# Patient Record
Sex: MALE | Race: WHITE | NOT HISPANIC OR LATINO | Employment: UNEMPLOYED | URBAN - METROPOLITAN AREA
[De-identification: names, ages, dates, MRNs, and addresses within clinical notes are randomized per-mention and may not be internally consistent; named-entity substitution may affect disease eponyms.]

---

## 2024-09-27 ENCOUNTER — APPOINTMENT (OUTPATIENT)
Dept: RADIOLOGY | Facility: HOSPITAL | Age: 49
End: 2024-09-27

## 2024-09-27 ENCOUNTER — HOSPITAL ENCOUNTER (EMERGENCY)
Facility: HOSPITAL | Age: 49
Discharge: HOME | End: 2024-09-27
Attending: STUDENT IN AN ORGANIZED HEALTH CARE EDUCATION/TRAINING PROGRAM

## 2024-09-27 VITALS
HEART RATE: 69 BPM | TEMPERATURE: 97.9 F | HEIGHT: 71 IN | RESPIRATION RATE: 16 BRPM | BODY MASS INDEX: 24.07 KG/M2 | OXYGEN SATURATION: 97 % | DIASTOLIC BLOOD PRESSURE: 71 MMHG | SYSTOLIC BLOOD PRESSURE: 101 MMHG | WEIGHT: 171.96 LBS

## 2024-09-27 DIAGNOSIS — K62.89 PROCTITIS: Primary | ICD-10-CM

## 2024-09-27 LAB
ALBUMIN SERPL BCP-MCNC: 3.6 G/DL (ref 3.4–5)
ALP SERPL-CCNC: 91 U/L (ref 33–120)
ALT SERPL W P-5'-P-CCNC: 9 U/L (ref 10–52)
ANION GAP SERPL CALC-SCNC: 11 MMOL/L (ref 10–20)
APTT PPP: 35 SECONDS (ref 27–38)
AST SERPL W P-5'-P-CCNC: 19 U/L (ref 9–39)
BASOPHILS # BLD AUTO: 0.04 X10*3/UL (ref 0–0.1)
BASOPHILS NFR BLD AUTO: 0.4 %
BILIRUB SERPL-MCNC: 0.4 MG/DL (ref 0–1.2)
BUN SERPL-MCNC: 16 MG/DL (ref 6–23)
CALCIUM SERPL-MCNC: 8.6 MG/DL (ref 8.6–10.3)
CHLORIDE SERPL-SCNC: 98 MMOL/L (ref 98–107)
CO2 SERPL-SCNC: 29 MMOL/L (ref 21–32)
CREAT SERPL-MCNC: 0.89 MG/DL (ref 0.5–1.3)
EGFRCR SERPLBLD CKD-EPI 2021: >90 ML/MIN/1.73M*2
EOSINOPHIL # BLD AUTO: 0.21 X10*3/UL (ref 0–0.7)
EOSINOPHIL NFR BLD AUTO: 2.4 %
ERYTHROCYTE [DISTWIDTH] IN BLOOD BY AUTOMATED COUNT: 12.7 % (ref 11.5–14.5)
GLUCOSE SERPL-MCNC: 100 MG/DL (ref 74–99)
HCT VFR BLD AUTO: 40.1 % (ref 41–52)
HGB BLD-MCNC: 12.9 G/DL (ref 13.5–17.5)
IMM GRANULOCYTES # BLD AUTO: 0.02 X10*3/UL (ref 0–0.7)
IMM GRANULOCYTES NFR BLD AUTO: 0.2 % (ref 0–0.9)
INR PPP: 1.2 (ref 0.9–1.1)
LACTATE SERPL-SCNC: 0.7 MMOL/L (ref 0.4–2)
LIPASE SERPL-CCNC: 5 U/L (ref 9–82)
LYMPHOCYTES # BLD AUTO: 1.6 X10*3/UL (ref 1.2–4.8)
LYMPHOCYTES NFR BLD AUTO: 17.9 %
MCH RBC QN AUTO: 27.9 PG (ref 26–34)
MCHC RBC AUTO-ENTMCNC: 32.2 G/DL (ref 32–36)
MCV RBC AUTO: 87 FL (ref 80–100)
MONOCYTES # BLD AUTO: 0.89 X10*3/UL (ref 0.1–1)
MONOCYTES NFR BLD AUTO: 10 %
NEUTROPHILS # BLD AUTO: 6.16 X10*3/UL (ref 1.2–7.7)
NEUTROPHILS NFR BLD AUTO: 69.1 %
NRBC BLD-RTO: 0 /100 WBCS (ref 0–0)
PLATELET # BLD AUTO: 338 X10*3/UL (ref 150–450)
POTASSIUM SERPL-SCNC: 4.5 MMOL/L (ref 3.5–5.3)
PROT SERPL-MCNC: 6.9 G/DL (ref 6.4–8.2)
PROTHROMBIN TIME: 13.7 SECONDS (ref 9.8–12.8)
RBC # BLD AUTO: 4.63 X10*6/UL (ref 4.5–5.9)
SODIUM SERPL-SCNC: 133 MMOL/L (ref 136–145)
WBC # BLD AUTO: 8.9 X10*3/UL (ref 4.4–11.3)

## 2024-09-27 PROCEDURE — 83690 ASSAY OF LIPASE: CPT | Performed by: PHYSICIAN ASSISTANT

## 2024-09-27 PROCEDURE — 36415 COLL VENOUS BLD VENIPUNCTURE: CPT | Performed by: PHYSICIAN ASSISTANT

## 2024-09-27 PROCEDURE — 83605 ASSAY OF LACTIC ACID: CPT | Performed by: PHYSICIAN ASSISTANT

## 2024-09-27 PROCEDURE — 2550000001 HC RX 255 CONTRASTS: Performed by: STUDENT IN AN ORGANIZED HEALTH CARE EDUCATION/TRAINING PROGRAM

## 2024-09-27 PROCEDURE — 80053 COMPREHEN METABOLIC PANEL: CPT | Performed by: PHYSICIAN ASSISTANT

## 2024-09-27 PROCEDURE — 74174 CTA ABD&PLVS W/CONTRAST: CPT | Mod: FOREIGN READ | Performed by: RADIOLOGY

## 2024-09-27 PROCEDURE — 85730 THROMBOPLASTIN TIME PARTIAL: CPT | Performed by: PHYSICIAN ASSISTANT

## 2024-09-27 PROCEDURE — 85610 PROTHROMBIN TIME: CPT | Performed by: PHYSICIAN ASSISTANT

## 2024-09-27 PROCEDURE — 99284 EMERGENCY DEPT VISIT MOD MDM: CPT | Mod: 25

## 2024-09-27 PROCEDURE — 74174 CTA ABD&PLVS W/CONTRAST: CPT

## 2024-09-27 PROCEDURE — 85025 COMPLETE CBC W/AUTO DIFF WBC: CPT | Performed by: PHYSICIAN ASSISTANT

## 2024-09-27 RX ORDER — METRONIDAZOLE 500 MG/1
500 TABLET ORAL 3 TIMES DAILY
Qty: 30 TABLET | Refills: 0 | Status: SHIPPED | OUTPATIENT
Start: 2024-09-27 | End: 2024-10-07

## 2024-09-27 RX ORDER — CIPROFLOXACIN 500 MG/1
500 TABLET ORAL 2 TIMES DAILY
Qty: 20 TABLET | Refills: 0 | Status: SHIPPED | OUTPATIENT
Start: 2024-09-27 | End: 2024-10-07

## 2024-09-27 RX ORDER — FENTANYL CITRATE 50 UG/ML
50 INJECTION, SOLUTION INTRAMUSCULAR; INTRAVENOUS ONCE
Status: DISCONTINUED | OUTPATIENT
Start: 2024-09-27 | End: 2024-09-27 | Stop reason: HOSPADM

## 2024-09-27 ASSESSMENT — COLUMBIA-SUICIDE SEVERITY RATING SCALE - C-SSRS
2. HAVE YOU ACTUALLY HAD ANY THOUGHTS OF KILLING YOURSELF?: NO
1. IN THE PAST MONTH, HAVE YOU WISHED YOU WERE DEAD OR WISHED YOU COULD GO TO SLEEP AND NOT WAKE UP?: NO
6. HAVE YOU EVER DONE ANYTHING, STARTED TO DO ANYTHING, OR PREPARED TO DO ANYTHING TO END YOUR LIFE?: NO

## 2024-09-27 ASSESSMENT — PAIN DESCRIPTION - ORIENTATION: ORIENTATION: LOWER

## 2024-09-27 ASSESSMENT — PAIN - FUNCTIONAL ASSESSMENT: PAIN_FUNCTIONAL_ASSESSMENT: 0-10

## 2024-09-27 ASSESSMENT — PAIN DESCRIPTION - DESCRIPTORS: DESCRIPTORS: ACHING

## 2024-09-27 ASSESSMENT — PAIN DESCRIPTION - LOCATION: LOCATION: ABDOMEN

## 2024-09-27 NOTE — DISCHARGE INSTRUCTIONS
Please follow-up with your primary care provider 1 to 2 days, or return to the emergency department immediately with any worsening symptoms.    If any medications were prescribed please take them as instructed.  
Statement Selected

## 2024-09-27 NOTE — ED PROVIDER NOTES
EMERGENCY MEDICINE EVALUATION NOTE    History of Present Illness     Chief Complaint:   Chief Complaint   Patient presents with    Rectal Bleeding     ABD PAIN        HPI: Jim Park is a 49 y.o. male presents with a chief complaint of rectal bleeding and abdominal pain.  Patient reports this been going on for over a year.  He states that he is to have an issue with rectal prolapse however he had a large hemorrhoid 1 time and push the hemorrhoid back into his rectum and since then his rectum has not prolapse.  He reports that basically anytime he goes to the bathroom he noticed that there is blood there.  He states when he does pass stool it is very hard and almost like he is constipated.  Patient reports that he has diffuse pain across his lower abdomen.  He states he does have a history of diverticulosis but has not been seen for this in a long time.  Patient reports that he has not had blood work or seen him physician in many years.  Sister reports that she went and checked on him and noted that he had lost a lot of weight and was that he was not eating anything so she brought him in for further evaluation.  Patient does not take any medication at home.  He states that he basically sits on his couch and then goes to the restroom as needed.  Denies any fevers or chills.    Previous History     Past Medical History:   Diagnosis Date    Strain of unspecified muscle, fascia and tendon at wrist and hand level, left hand, initial encounter 05/02/2016    Strain of left hand, initial encounter     History reviewed. No pertinent surgical history.     No family history on file.  No Known Allergies  Current Outpatient Medications   Medication Instructions    ciprofloxacin (CIPRO) 500 mg, oral, 2 times daily    metroNIDAZOLE (FLAGYL) 500 mg, oral, 3 times daily       Physical Exam     Appearance: Alert, oriented , cooperative     Skin: Intact,  dry skin, no lesions, rash, petechiae or purpura.      Eyes: PERRLA, EOMs intact,   Conjunctiva pink      ENT: Hearing grossly intact. Pharynx clear, uvula midline.      Neck: Supple. Trachea at midline.      Pulmonary: Clear bilaterally. No rales, rhonchi or wheezing. No accessory muscle use or stridor.     Cardiac: Normal rate and rhythm without murmur     Abdomen: Soft, active bowel sounds.  Diffuse lower abdominal pain.  Easily reducible ventral wall hernia.     Musculoskeletal: Full range of motion.     Neurological:Cranial nerves II through XII are grossly intact, normal sensation, no weakness, no focal findings identified.     Results     Labs Reviewed   CBC WITH AUTO DIFFERENTIAL - Abnormal       Result Value    WBC 8.9      nRBC 0.0      RBC 4.63      Hemoglobin 12.9 (*)     Hematocrit 40.1 (*)     MCV 87      MCH 27.9      MCHC 32.2      RDW 12.7      Platelets 338      Neutrophils % 69.1      Immature Granulocytes %, Automated 0.2      Lymphocytes % 17.9      Monocytes % 10.0      Eosinophils % 2.4      Basophils % 0.4      Neutrophils Absolute 6.16      Immature Granulocytes Absolute, Automated 0.02      Lymphocytes Absolute 1.60      Monocytes Absolute 0.89      Eosinophils Absolute 0.21      Basophils Absolute 0.04     COMPREHENSIVE METABOLIC PANEL - Abnormal    Glucose 100 (*)     Sodium 133 (*)     Potassium 4.5      Chloride 98      Bicarbonate 29      Anion Gap 11      Urea Nitrogen 16      Creatinine 0.89      eGFR >90      Calcium 8.6      Albumin 3.6      Alkaline Phosphatase 91      Total Protein 6.9      AST 19      Bilirubin, Total 0.4      ALT 9 (*)    LIPASE - Abnormal    Lipase 5 (*)     Narrative:     Venipuncture immediately after or during the administration of Metamizole may lead to falsely low results. Testing should be performed immediately prior to Metamizole dosing.   PROTIME-INR - Abnormal    Protime 13.7 (*)     INR 1.2 (*)    LACTATE - Normal    Lactate 0.7      Narrative:     Venipuncture immediately after or during the administration of Metamizole may lead  "to falsely low results. Testing should be performed immediately prior to Metamizole dosing.   APTT - Normal    aPTT 35      Narrative:     The APTT is no longer used for monitoring Unfractionated Heparin Therapy. For monitoring Heparin Therapy, use the Heparin Assay.   URINALYSIS WITH REFLEX CULTURE AND MICROSCOPIC    Narrative:     The following orders were created for panel order Urinalysis with Reflex Culture and Microscopic.  Procedure                               Abnormality         Status                     ---------                               -----------         ------                     Urinalysis with Reflex C...[[id number]]                                                 Extra Urine Gray Tube[[id number]]                                                         Please view results for these tests on the individual orders.   URINALYSIS WITH REFLEX CULTURE AND MICROSCOPIC   EXTRA URINE GRAY TUBE     CT angio abdomen pelvis w and or wo IV IV contrast   Final Result   No evidence of active GI bleeding. Bilateral lower lobe pulmonary   nodules and diffuse liver lesions consistent with metastatic disease.   Rectal wall thickening which may be due to proctitis. Underlying   neoplasm to be excluded. Normal abdominal aorta and iliac arteries. No   evidence of mesenteric artery stenosis or occlusion. Normal appendix.   Signed by Phu Mendoza MD            ED Course & Medical Decision Making     Medications   fentaNYL PF (Sublimaze) injection 50 mcg (has no administration in time range)   iohexol (OMNIPaque) 350 mg iodine/mL solution 100 mL (100 mL intravenous Given 9/27/24 1241)     Heart Rate:  [69-86]   Temperature:  [36.6 °C (97.9 °F)]   Respirations:  [12-16]   BP: (101-113)/(70-79)   Height:  [180.3 cm (5' 11\")]   Weight:  [78 kg (171 lb 15.3 oz)]   Pulse Ox:  [97 %-98 %]    ED Course as of 09/27/24 1450   Fri Sep 27, 2024   1447 Updated the patient on the results.  Patient does have significant proctitis " and they cannot rule out underlying mass.  Patient also has some potential metastatic lesion on the liver.  I did update the patient on the results and the findings.  We discussed plan of care going forward.  I informed him he needs to follow-up as an outpatient with GI as well as he will be given referral to the [facility] center for potential management if there were to be a mass.  From our perspective we will treat him as if it is infectious we will give him Cipro and Flagyl for home.  Patient is in agreement with this plan.  Patient be discharged at this time to follow-up as an outpatient return here immediately with any worsening symptoms.  Family was at bedside who also agrees with plan of care. [CJ]      ED Course User Index  [CJ] Faustino Morrison PA-C         Diagnoses as of 09/27/24 1450   Proctitis       Procedures   Procedures    Diagnosis     1. Proctitis        Disposition   Discharged     ED Prescriptions       Medication Sig Dispense Start Date End Date Auth. Provider    ciprofloxacin (Cipro) 500 mg tablet Take 1 tablet (500 mg) by mouth 2 times a day for 10 days. 20 tablet 9/27/2024 10/7/2024 Faustino Morrison PA-C    metroNIDAZOLE (Flagyl) 500 mg tablet Take 1 tablet (500 mg) by mouth 3 times a day for 10 days. 30 tablet 9/27/2024 10/7/2024 Faustino Morrison PA-C            Disclaimer: This note was dictated by speech recognition. Minor errors in transcription may be present. Please call if questions.       Faustino Morrison PA-C  09/27/24 1450       Faustino Morrison PA-C  09/27/24 1450

## 2024-09-30 ENCOUNTER — TELEPHONE (OUTPATIENT)
Dept: HEMATOLOGY/ONCOLOGY | Facility: HOSPITAL | Age: 49
End: 2024-09-30

## 2024-09-30 ENCOUNTER — APPOINTMENT (OUTPATIENT)
Dept: GASTROENTEROLOGY | Facility: CLINIC | Age: 49
End: 2024-09-30

## 2024-09-30 VITALS
WEIGHT: 177 LBS | BODY MASS INDEX: 24.78 KG/M2 | DIASTOLIC BLOOD PRESSURE: 64 MMHG | HEART RATE: 75 BPM | HEIGHT: 71 IN | SYSTOLIC BLOOD PRESSURE: 100 MMHG | OXYGEN SATURATION: 97 %

## 2024-09-30 DIAGNOSIS — R93.89 ABNORMAL CT SCAN: Primary | ICD-10-CM

## 2024-09-30 DIAGNOSIS — K62.89 PROCTITIS: ICD-10-CM

## 2024-09-30 PROCEDURE — 3008F BODY MASS INDEX DOCD: CPT | Performed by: PHYSICIAN ASSISTANT

## 2024-09-30 PROCEDURE — 99204 OFFICE O/P NEW MOD 45 MIN: CPT | Performed by: PHYSICIAN ASSISTANT

## 2024-09-30 RX ORDER — POLYETHYLENE GLYCOL 3350, SODIUM SULFATE ANHYDROUS, SODIUM BICARBONATE, SODIUM CHLORIDE, POTASSIUM CHLORIDE 236; 22.74; 6.74; 5.86; 2.97 G/4L; G/4L; G/4L; G/4L; G/4L
4000 POWDER, FOR SOLUTION ORAL ONCE
Qty: 4000 ML | Refills: 0 | Status: SHIPPED | OUTPATIENT
Start: 2024-09-30 | End: 2024-09-30

## 2024-09-30 NOTE — H&P (VIEW-ONLY)
"Subjective   Patient ID: Jim Park is a 49 y.o. male who was referred by ER providers for New Patient Visit.    No PCP    PMH: severe needle phobia    HPI  Patient is accompanied by sister.     Patient was in the ER on 9/27/2024 for rectal bleeding and abdominal pain. Patient states that he has been having rectal bleeding for the past year. He states that he would have a feeling to \"go to the bathroom\" and only pass red blood with mucous. He states that he has had worsening constipation. He states that the constipation because \"really bad\", but he did not give how long in between bowel movements he would go. He states that he has increased water and started OTC laxative with some improvement. Patient states that he has been having rectal pain, which has improved with antibiotics from the ER. Patient has been having some RUQ pain that he feels is related to a hernia. Just over the past 24 hours he has been having some right flank pain with taking a deep breath, which he feels he pulled a muscle. He states that he has lost 35 pounds unexplained this year. +nausea, no vomiting. Denies dysphagia. Patient reports what he suspected in the past as having a rectal prolapse that he was able to reduce on it's own. Denies jaundice.    In the ER, CBC showed slight anemia with hemoglobin of 12.9 with normocytic, hypochromic indices. LFTs normal. CT scan of the abdomen and pelvis showed bilateral lower lobe pulmonary nodules, diffuse liver lesions concerning for metastatic disease, and rectal wall thickening concerning for proctitis but mass not excluded. ER sent patient home with cipro and flagyl, and he admits that this has helped the rectal discomfort significantly.    Social History:  Tobacco: Former  Alcohol: None  Drug use: +THC    Family History: Denies     Prior GI evaluation:  Never    Review of Systems:  Constitutional: +35 pound weight loss  Skin: No reported icterus, lesions, or rash.  Eye: No reported itching, pain, " vision changes.  Ear: No reported discharge, hearing loss, or pain.  Nose: No reported congestion, discharge, or epistaxis.  Mouth/throat: No reported dysphagia, hoarseness, or throat pain.  Resp: No reported cough, dyspnea, or wheezing.  Cardiovascular: No reported chest pain, lower extremity edema, or palpitations.   GI: +worsening constipation, rectal bleeding, abdominal pain, rectal pain  : No reported dysuria, hematuria, or frequency.  Neuro: No reported confusion, memory loss, headaches, or dizziness.  Psych: No reported anxiety, depression, or insomnia.  Musculoskeletal: No reported arthralgia, joint swelling, or myalgias.  Heme/lymph: No reported easy bleeding or bruising, or swollen lymph nodes.  Endocrine: No reported cold/heat intolerance, polydipsia, or polyuria.     Medications:  Prior to Admission medications    Medication Sig Start Date End Date Taking? Authorizing Provider   ciprofloxacin (Cipro) 500 mg tablet Take 1 tablet (500 mg) by mouth 2 times a day for 10 days. 9/27/24 10/7/24  Faustino Morrison PA-C   metroNIDAZOLE (Flagyl) 500 mg tablet Take 1 tablet (500 mg) by mouth 3 times a day for 10 days. 9/27/24 10/7/24  Faustino Morrison PA-C       Allergies:  Patient has no known allergies.    Objective   Physical exam:  Constitutional: Well developed, well nourished 49 y.o. year old in no acute distress.   Skin: Warm and dry. Normal turgor. No rash, ulcer, trauma, jaundice.   Eyes: Pupils symmetric and reactive to light.  Respiratory: Clear to auscultation bilaterally. No wheezes, rhonchi, or rales heard.  Cardiovascular: Regular rate and rhythm. S1 and S2 appreciated and normal. No murmur, rub, or gallop heard.   Edema: No edema noted.  GI: Normal bowel sounds. Soft, non-distended, non-tender. No rebound or guarding present. No hepatomegaly or splenomegaly appreciated. Abdominal aorta not palpably abnormal.  Musculoskeletal: Limbs and Joints grossly normal. Full range of motion in major joint.   Neuro:  Alert and oriented x 3. Cranial nerves 2-12 grossly intact.   Psych: Normal mood and affect.        Relevant Results Recent labs reviewed in the EMR.    Radiology: Reviewed imaging reviewed in the EMR.    Assessment/Plan   Problem List Items Addressed This Visit             ICD-10-CM    Abnormal CT scan - Primary R93.89     Discussed with patient and his sister that given abnormal CT scan, worsening symptoms, rectal pain, and evidence of metastatic disease, I am concerned about rectal mass/cancer, which I discussed with patient and his sister. Patient does feel better since starting cipro and flagyl that the ER gave him. I recommended CEA and colonoscopy ASAP. Golytely prescribed. Patient is not vomiting.          Relevant Medications    Golytely 236-22.74-6.74 -5.86 gram solution    Other Relevant Orders    CEA    Colonoscopy Diagnostic     Other Visit Diagnoses         Codes    Proctitis     K62.89    Relevant Medications    Golytely 236-22.74-6.74 -5.86 gram solution    Other Relevant Orders    CEA    Colonoscopy Diagnostic            Meds to hold: none  Courtney Cruz PA-C

## 2024-09-30 NOTE — TELEPHONE ENCOUNTER
I spoke to Mr Park's sister Winsome this morning ([phone]). She is his primary support and transportation to appointments. We discussed that the [facility] oncology team would be the best next step since Mr. Park does not have insurance. I sent a referral to [facility] this morning. She verbalized understanding and I will be in touch as soon as I have more information.

## 2024-09-30 NOTE — ASSESSMENT & PLAN NOTE
Discussed with patient and his sister that given abnormal CT scan, worsening symptoms, rectal pain, and evidence of metastatic disease, I am concerned about rectal mass/cancer, which I discussed with patient and his sister. Patient does feel better since starting cipro and flagyl that the ER gave him. I recommended CEA and colonoscopy ASAP. Golytely prescribed. Patient is not vomiting.

## 2024-09-30 NOTE — PATIENT INSTRUCTIONS
Thank you for coming in for your appointment.    -Get colonoscopy done ASAP  -Bowel prep sent to pharmacy  -Blood work as able

## 2024-10-16 ENCOUNTER — PREP FOR PROCEDURE (OUTPATIENT)
Dept: GASTROENTEROLOGY | Facility: CLINIC | Age: 49
End: 2024-10-16
Payer: MEDICAID

## 2024-10-16 ENCOUNTER — ANESTHESIA EVENT (OUTPATIENT)
Dept: GASTROENTEROLOGY | Facility: HOSPITAL | Age: 49
End: 2024-10-16
Payer: MEDICAID

## 2024-10-17 ENCOUNTER — HOSPITAL ENCOUNTER (OUTPATIENT)
Dept: GASTROENTEROLOGY | Facility: HOSPITAL | Age: 49
Discharge: HOME | End: 2024-10-17
Payer: MEDICAID

## 2024-10-17 ENCOUNTER — ANESTHESIA (OUTPATIENT)
Dept: GASTROENTEROLOGY | Facility: HOSPITAL | Age: 49
End: 2024-10-17
Payer: MEDICAID

## 2024-10-17 VITALS
OXYGEN SATURATION: 97 % | HEART RATE: 87 BPM | SYSTOLIC BLOOD PRESSURE: 118 MMHG | RESPIRATION RATE: 16 BRPM | TEMPERATURE: 98.7 F | DIASTOLIC BLOOD PRESSURE: 80 MMHG

## 2024-10-17 DIAGNOSIS — K62.89 PROCTITIS: ICD-10-CM

## 2024-10-17 DIAGNOSIS — R93.89 ABNORMAL CT SCAN: ICD-10-CM

## 2024-10-17 DIAGNOSIS — C20 RECTAL CANCER (MULTI): Primary | ICD-10-CM

## 2024-10-17 PROCEDURE — 3700000001 HC GENERAL ANESTHESIA TIME - INITIAL BASE CHARGE

## 2024-10-17 PROCEDURE — 45331 SIGMOIDOSCOPY AND BIOPSY: CPT | Performed by: INTERNAL MEDICINE

## 2024-10-17 PROCEDURE — 7100000010 HC PHASE TWO TIME - EACH INCREMENTAL 1 MINUTE

## 2024-10-17 PROCEDURE — 7100000009 HC PHASE TWO TIME - INITIAL BASE CHARGE

## 2024-10-17 PROCEDURE — 3700000002 HC GENERAL ANESTHESIA TIME - EACH INCREMENTAL 1 MINUTE

## 2024-10-17 PROCEDURE — 2500000004 HC RX 250 GENERAL PHARMACY W/ HCPCS (ALT 636 FOR OP/ED): Performed by: NURSE ANESTHETIST, CERTIFIED REGISTERED

## 2024-10-17 RX ORDER — FENTANYL CITRATE 50 UG/ML
INJECTION, SOLUTION INTRAMUSCULAR; INTRAVENOUS AS NEEDED
Status: DISCONTINUED | OUTPATIENT
Start: 2024-10-17 | End: 2024-10-17

## 2024-10-17 RX ORDER — SODIUM CHLORIDE 0.9 % (FLUSH) 0.9 %
SYRINGE (ML) INJECTION AS NEEDED
Status: DISCONTINUED | OUTPATIENT
Start: 2024-10-17 | End: 2024-10-17

## 2024-10-17 RX ORDER — LIDOCAINE HYDROCHLORIDE 20 MG/ML
INJECTION, SOLUTION INFILTRATION; PERINEURAL AS NEEDED
Status: DISCONTINUED | OUTPATIENT
Start: 2024-10-17 | End: 2024-10-17

## 2024-10-17 RX ORDER — PROPOFOL 10 MG/ML
INJECTION, EMULSION INTRAVENOUS AS NEEDED
Status: DISCONTINUED | OUTPATIENT
Start: 2024-10-17 | End: 2024-10-17

## 2024-10-17 SDOH — HEALTH STABILITY: MENTAL HEALTH: CURRENT SMOKER: 0

## 2024-10-17 ASSESSMENT — PAIN SCALES - GENERAL
PAINLEVEL_OUTOF10: 1
PAINLEVEL_OUTOF10: 0 - NO PAIN
PAINLEVEL_OUTOF10: 1
PAIN_LEVEL: 0

## 2024-10-17 ASSESSMENT — PAIN - FUNCTIONAL ASSESSMENT
PAIN_FUNCTIONAL_ASSESSMENT: 0-10

## 2024-10-17 NOTE — ANESTHESIA POSTPROCEDURE EVALUATION
Patient: Jim Park    Procedure Summary       Date: 10/17/24 Room / Location: [facility]    Anesthesia Start: 0913 Anesthesia Stop: 0933    Procedure: COLONOSCOPY Diagnosis:       Proctitis      Abnormal CT scan    Scheduled Providers: David Anaya MD Responsible Provider: DOUG Solis    Anesthesia Type: MAC ASA Status: 2            Anesthesia Type: MAC    Vitals Value Taken Time   /74 10/17/24 0942   Temp 36.7 °C (98.1 °F) 10/17/24 0932   Pulse 90 10/17/24 0942   Resp 16 10/17/24 0942   SpO2 97 % 10/17/24 0942       Anesthesia Post Evaluation    Patient location during evaluation: bedside  Patient participation: complete - patient participated  Level of consciousness: awake and alert  Pain score: 0  Pain management: adequate  Airway patency: patent  Cardiovascular status: acceptable and hemodynamically stable  Respiratory status: acceptable  Hydration status: acceptable  Postoperative Nausea and Vomiting: none        There were no known notable events for this encounter.

## 2024-10-17 NOTE — ANESTHESIA PREPROCEDURE EVALUATION
Patient: Jim Park    Procedure Information       Anesthesia Start Date/Time: 10/17/24 0913    Scheduled providers: David Anaya MD    Procedure: COLONOSCOPY    Location: [facility]            Relevant Problems   Anesthesia (within normal limits)      Cardiac (within normal limits)      Pulmonary (within normal limits)      Neuro (within normal limits)      GI (within normal limits)      /Renal (within normal limits)      Liver (within normal limits)      Endocrine (within normal limits)      Hematology (within normal limits)      Musculoskeletal (within normal limits)      HEENT (within normal limits)      ID (within normal limits)      Skin (within normal limits)      GYN (within normal limits)       Clinical information reviewed:   Tobacco  Allergies  Meds   Med Hx  Surg Hx   Fam Hx  Soc Hx        NPO Detail:  NPO/Void Status  Carbohydrate Drink Given Prior to Surgery? : N  Date of Last Liquid: 10/17/24  Time of Last Liquid: 0630  Date of Last Solid: 10/15/24  Time of Last Solid: 1800  Last Intake Type: Clear fluids  Time of Last Void: 0843         Physical Exam    Airway  Mallampati: II  TM distance: >3 FB  Neck ROM: full     Cardiovascular - normal exam  Rhythm: regular  Rate: normal     Dental    Pulmonary - normal exam     Abdominal - normal exam             Anesthesia Plan    History of general anesthesia?: yes  History of complications of general anesthesia?: no    ASA 2     MAC     The patient is not a current smoker.    intravenous induction   Anesthetic plan and risks discussed with patient.

## 2024-10-18 ENCOUNTER — TELEPHONE (OUTPATIENT)
Dept: HEMATOLOGY/ONCOLOGY | Facility: HOSPITAL | Age: 49
End: 2024-10-18
Payer: MEDICAID

## 2024-10-18 NOTE — ADDENDUM NOTE
Encounter addended by: Norma Jenkins RN on: 10/18/2024 8:27 AM   Actions taken: Contacts section saved, Flowsheet accepted

## 2024-10-18 NOTE — TELEPHONE ENCOUNTER
Attempted to call Mr. Park and his sister Winsome to check-in after his colonoscopy yesterday. Will call again early next week if we don't get a call back.

## 2024-10-25 LAB
LAB AP ASR DISCLAIMER: NORMAL
LABORATORY COMMENT REPORT: NORMAL
PATH REPORT.COMMENTS IMP SPEC: NORMAL
PATH REPORT.FINAL DX SPEC: NORMAL
PATH REPORT.GROSS SPEC: NORMAL
PATH REPORT.TOTAL CANCER: NORMAL

## 2024-10-28 ENCOUNTER — TELEPHONE (OUTPATIENT)
Dept: GASTROENTEROLOGY | Facility: CLINIC | Age: 49
End: 2024-10-28
Payer: MEDICAID

## 2024-10-28 ENCOUNTER — TELEPHONE (OUTPATIENT)
Dept: HEMATOLOGY/ONCOLOGY | Facility: HOSPITAL | Age: 49
End: 2024-10-28
Payer: MEDICAID

## 2024-10-29 LAB
LAB AP ASR DISCLAIMER: NORMAL
LABORATORY COMMENT REPORT: NORMAL
PATH REPORT.ADDENDUM SPEC: NORMAL
PATH REPORT.COMMENTS IMP SPEC: NORMAL
PATH REPORT.FINAL DX SPEC: NORMAL
PATH REPORT.GROSS SPEC: NORMAL
PATH REPORT.TOTAL CANCER: NORMAL